# Patient Record
Sex: FEMALE | Race: BLACK OR AFRICAN AMERICAN | NOT HISPANIC OR LATINO | URBAN - METROPOLITAN AREA
[De-identification: names, ages, dates, MRNs, and addresses within clinical notes are randomized per-mention and may not be internally consistent; named-entity substitution may affect disease eponyms.]

---

## 2017-11-28 ENCOUNTER — OUTPATIENT (OUTPATIENT)
Dept: OUTPATIENT SERVICES | Facility: HOSPITAL | Age: 62
LOS: 1 days | End: 2017-11-28

## 2017-12-07 ENCOUNTER — OUTPATIENT (OUTPATIENT)
Dept: OUTPATIENT SERVICES | Facility: HOSPITAL | Age: 62
LOS: 1 days | End: 2017-12-07

## 2023-05-19 ENCOUNTER — APPOINTMENT (OUTPATIENT)
Dept: ORTHOPEDIC SURGERY | Facility: CLINIC | Age: 68
End: 2023-05-19
Payer: MEDICARE

## 2023-05-19 VITALS
BODY MASS INDEX: 31.15 KG/M2 | DIASTOLIC BLOOD PRESSURE: 93 MMHG | WEIGHT: 165 LBS | HEIGHT: 61 IN | SYSTOLIC BLOOD PRESSURE: 173 MMHG | HEART RATE: 94 BPM

## 2023-05-19 DIAGNOSIS — Z98.890 OTHER SPECIFIED POSTPROCEDURAL STATES: ICD-10-CM

## 2023-05-19 PROCEDURE — 99203 OFFICE O/P NEW LOW 30 MIN: CPT

## 2023-05-19 NOTE — HISTORY OF PRESENT ILLNESS
[Worsening] : worsening [Intermit.] : ~He/She~ states the symptoms seem to be intermittent [Direct Pressure] : worsened by direct pressure [Lifting] : worsened by lifting [Rest] : relieved by rest [de-identified] : DOS: 02/22/23\par \par 05/19/2023 : MARK MONTES  is a 67 year  old female who presents to the office for evaluation of her left shoulder.  She is here for second opinion because she had a surgery performed by Dr. Soni who performed a left shoulder rotator cuff repair surgery on the above-mentioned date.  She states she was doing rehab and have any acute traumatic injury but had a lot of pain and swelling.  She saw her primary care and she advised her to go back and see her orthopedic which she did.  They ordered a repeat MRI that showed a retear of the rotator cuff.  He recommended she performed 2 months of continued physical therapy to try and maximize her range of motion, strength and for her pain because he was unsure if she would do well with a revision surgery due to her bone quality intraoperatively.  She is here for a second opinion today because of her pain and dysfunction and known retear of her rotator cuff.  She denies any numbness or tingling distally.  She has no other complaints today.

## 2023-05-19 NOTE — DISCUSSION/SUMMARY
[de-identified] : Assessment: Patient is 67-year-old female status post left shoulder arthroscopic rotator cuff repair with retear by Dr. Soni\par \par Plan: I had a long discussion with the patient today regarding the nature of their diagnosis and treatment plan.   I reviewed the MRI today that revealed a full-thickness retear of the supraspinatus in the setting of significant postsurgical changes. We discussed the risks and benefits of no treatment as well as nonoperative and operative treatments.  The patient is now 3 months out from surgery but continues to have some significant stiffness in the shoulder.  I recommend continued conservative care at this time with resting, icing, heating, stretching, anti-inflammatory medicines as needed, activity modifications, and home exercises.  I recommend continued physical therapy to restore her arc of motion and to resolve her stiffness and begin progressive strengthening exercises.  Recommend follow-up in 3 months for repeat clinical assessment.  If the patient were to have continued pain and/or weakness despite conservative care then revision rotator cuff repair could be considered.  She will follow-up with her other surgeon for further instruction.\par  \par The patient verbalizes their understanding and agrees with the plan.  All questions were answered to their satisfaction.

## 2024-02-08 NOTE — HISTORY OF PRESENT ILLNESS
[FreeTextEntry1] : MARK is a 68 year female who presents for OAB. Here for [facility] consult.     She was referred by   POB: GYN: PMH: PSH: Meds: Allx:     Daytime frequency: Nocturia: Urinary urgency: Leakage of urine with urgency: Leakage of urine with coughing sneezing laughing: Incontinence pad use: Sensation of incomplete bladder emptying: History of frequent urinary tract infections: History of hematuria: Previous treatment: Vaginal symptoms: Bowel symptoms:

## 2024-02-08 NOTE — ADDENDUM
[FreeTextEntry1] : This note was written by Taylor Osullivan, acting as the  for Dr. aJde. This note accurately reflects the work and decisions made by Dr. Jade.

## 2024-02-14 ENCOUNTER — APPOINTMENT (OUTPATIENT)
Dept: UROGYNECOLOGY | Facility: CLINIC | Age: 69
End: 2024-02-14

## 2024-11-01 ENCOUNTER — OFFICE (OUTPATIENT)
Dept: URBAN - METROPOLITAN AREA CLINIC 104 | Facility: CLINIC | Age: 69
Setting detail: OPHTHALMOLOGY
End: 2024-11-01
Payer: MEDICARE

## 2024-11-01 DIAGNOSIS — H25.13: ICD-10-CM

## 2024-11-01 PROCEDURE — 92004 COMPRE OPH EXAM NEW PT 1/>: CPT | Performed by: OPTOMETRIST

## 2024-11-01 ASSESSMENT — KERATOMETRY
OD_K1POWER_DIOPTERS: 43.10
OD_K2POWER_DIOPTERS: 45.24
OD_AXISANGLE_DEGREES: 62

## 2024-11-01 ASSESSMENT — REFRACTION_AUTOREFRACTION
OS_SPHERE: -3.75
OD_SPHERE: -3.50
OD_CYLINDER: -1.50
OD_AXIS: 156
OS_AXIS: 8
OS_CYLINDER: -1.75

## 2024-11-01 ASSESSMENT — REFRACTION_CURRENTRX
OD_SPHERE: -3.50
OS_OVR_VA: 20/
OD_OVR_VA: 20/
OS_AXIS: 23
OD_AXIS: 158
OS_CYLINDER: -1.75
OD_CYLINDER: -1.75
OS_SPHERE: -4.00

## 2024-11-01 ASSESSMENT — CONFRONTATIONAL VISUAL FIELD TEST (CVF)
OS_FINDINGS: FULL
OD_FINDINGS: FULL

## 2024-11-01 ASSESSMENT — VISUAL ACUITY
OS_BCVA: 20/30+2
OD_BCVA: 20/30+2